# Patient Record
Sex: MALE | Race: WHITE | NOT HISPANIC OR LATINO | Employment: FULL TIME | URBAN - METROPOLITAN AREA
[De-identification: names, ages, dates, MRNs, and addresses within clinical notes are randomized per-mention and may not be internally consistent; named-entity substitution may affect disease eponyms.]

---

## 2018-10-03 ENCOUNTER — OFFICE VISIT (OUTPATIENT)
Dept: NEUROLOGY | Facility: CLINIC | Age: 43
End: 2018-10-03

## 2018-10-03 VITALS
HEIGHT: 71 IN | BODY MASS INDEX: 23.1 KG/M2 | OXYGEN SATURATION: 98 % | HEART RATE: 72 BPM | DIASTOLIC BLOOD PRESSURE: 80 MMHG | SYSTOLIC BLOOD PRESSURE: 125 MMHG | WEIGHT: 165 LBS

## 2018-10-03 DIAGNOSIS — R20.2 NUMBNESS AND TINGLING IN LEFT ARM: Primary | ICD-10-CM

## 2018-10-03 DIAGNOSIS — R20.0 LEFT LEG NUMBNESS: ICD-10-CM

## 2018-10-03 DIAGNOSIS — R90.82 WHITE MATTER ABNORMALITY ON MRI OF BRAIN: ICD-10-CM

## 2018-10-03 DIAGNOSIS — R20.0 NUMBNESS AND TINGLING IN LEFT ARM: Primary | ICD-10-CM

## 2018-10-03 DIAGNOSIS — R29.898 WEAKNESS OF BOTH LEGS: ICD-10-CM

## 2018-10-03 PROCEDURE — 99244 OFF/OP CNSLTJ NEW/EST MOD 40: CPT | Performed by: PSYCHIATRY & NEUROLOGY

## 2018-10-03 NOTE — PROGRESS NOTES
Bayron Hanks is a 42 y.o. male presents for   Chief Complaint   Patient presents with   • Neuralgia             CHIEF COMPLAINT:  Neurological consultation from Dr. Tobin Curran with:  1.  Left lower extremity pain and numbness  2.  Left upper extremity numbness  3.  Single episode of transient weakness of the legs  4.  Abnormal brain MRI    History of Present Illness  A 42 YOM, right-handed who is here was multiple complaints as noted on the chief complaint.  Patient was well, 100%, until 6 months ago.  #1.  Pain into posterior left leg and buttock  Pain is described as deep and dull, comes and goes, not associated with weakness, not associated with low back pain, pain does not go below the level of the left knee, the pain is always a posteriorly located.  Symptoms have not gotten any worse and that has been no related news symptoms  Pain is only present when he is sitting or lying down and goes away when he walking or standing or moving around.  He has no weakness in his left lower extremity, he is able to go up stairs and walk without difficulty.  He has no symptoms in his right lower extremity.   There has been no difficulty with bowel or bladder function or sexual function  Patient does not take any medications for this and this has not alleviated.  This is activity of daily living  Workup including MRI of the lumbar spine as well as MRI of the thoracic spine on September 4, 2018.  I reviewed the MRI reports which shows a very tiny left foraminal narrowing at L4 5 and a small disc bulge at T2-T3 without neurological compromise.    #2.  Left upper extremity numbness.  That started 2 months ago for no reason.  He describes it as numb or tingly, comes and goes, it involves the inferior left shoulder joint posteriorly down to the left little finger, sparing the rest of the left upper extremity.  It comes and goes for no reason.  He has peripheral dysfunctions the left upper extremity including 100% 5  multiple throat.  He has had no weakness with it.  This is totally independent of the problem in his left lower extremity.  He has no neck pain.  It comes and goes for no clear reason whatsoever.  He has had no associated neurological or systemic symptoms otherwise.  Workup included MRI scan of the brain on September 4, 2018 showing a tiny benign-appearing punctate area of increased T2 signal deep in the left frontal lobe.  No other abnormalities were noted      #3.  As we were about to close the visit the patient indicated to me that 1 month.  2 the MRI scans, patient woke up at his usual time 6 AM.  9 AM he went to the bathroom to have a bowel movement, he felt a bit constipated, he was in the toilet for about 20 minutes, when he stood up his legs gave out on him and he lost control of his bowels.  No other associated neurological symptoms.  He was able to grab the door he fell backwards back of the toilet seat without suffering any injury.  Was no pain associated with this.  He thinks his legs may have gotten numb sitting on the toilet.    The rest of the patient's comprehensive neurological review was otherwise nonremarkable  Patient is very worried about the abnormalities on his imaging studies and is looking for answers and explanations.    I have reviewed with the patient the review of systems, past medical history, family history, social history and medications.  The following portions of the patient's history were reviewed and updated as appropriate: allergies, current medications, past family history, past medical history, past social history, past surgical history and problem list.        Review of Systems   Constitutional: Negative.    HENT: Negative.    Eyes: Negative.    Respiratory: Negative.    Cardiovascular: Negative.    Gastrointestinal: Negative.    Endocrine: Negative.    Genitourinary: Negative.    Musculoskeletal: Negative.    Skin: Negative.    Allergic/Immunologic: Negative.    Neurological:  Positive for numbness. Negative for dizziness, tremors, seizures, syncope, facial asymmetry, speech difficulty, weakness, light-headedness and headaches.   Hematological: Negative.    Psychiatric/Behavioral: Negative.          History reviewed. No pertinent past medical history.      Social History     Social History   • Marital status:      Spouse name: N/A   • Number of children: N/A   • Years of education: N/A     Occupational History   • Not on file.     Social History Main Topics   • Smoking status: Never Smoker   • Smokeless tobacco: Not on file   • Alcohol use Yes      Comment: 0-5 DRINKS PER WEEK   • Drug use: No   • Sexual activity: Not on file     Other Topics Concern   • Not on file     Social History Narrative   • No narrative on file          History reviewed. No pertinent family history.      No current outpatient prescriptions on file.      Vitals:    10/03/18 0817   BP: 125/80   Pulse: 72   SpO2: 98%         Physical Exam   Constitutional: He is oriented to person, place, and time. He appears well-developed and well-nourished. No distress.   HENT:   Head: Normocephalic and atraumatic.   Mouth/Throat: Oropharynx is clear and moist. No oropharyngeal exudate.   Eyes: Pupils are equal, round, and reactive to light. Conjunctivae and EOM are normal. Right eye exhibits no discharge. Left eye exhibits no discharge. No scleral icterus.   Neck: Normal range of motion.   No foraminal compression signs   Cardiovascular: Normal rate, regular rhythm, normal heart sounds and intact distal pulses.    No murmur heard.  Normal carotid upstroke and normal auscultation   Pulmonary/Chest: Breath sounds normal. No respiratory distress. He has no wheezes.   Abdominal: Soft. Bowel sounds are normal. He exhibits no distension. There is no tenderness.   Musculoskeletal: He exhibits no edema.   Neurological: He is oriented to person, place, and time. He has normal strength. He has a normal Finger-Nose-Finger Test, a  normal Heel to Miller Test, a normal Romberg Test and a normal Tandem Gait Test. Gait normal.   Reflex Scores:       Tricep reflexes are 1+ on the right side and 1+ on the left side.       Bicep reflexes are 1+ on the right side and 1+ on the left side.       Brachioradialis reflexes are 1+ on the right side and 1+ on the left side.       Patellar reflexes are 1+ on the right side and 1+ on the left side.       Achilles reflexes are 1+ on the right side and 1+ on the left side.  Skin: Skin is warm. He is not diaphoretic.   Psychiatric: His speech is normal.         Neurologic Exam     Mental Status   Oriented to person, place, and time.   Registration: recalls 3 of 3 objects. Recall at 5 minutes: recalls 3 of 3 objects. Follows 3 step commands.   Attention: normal. Concentration: normal.   Speech: speech is normal   Level of consciousness: alert  Able to name object. Able to read. Able to repeat. Able to write. Normal comprehension.     Cranial Nerves     CN II   Visual fields full to confrontation.     CN III, IV, VI   Pupils are equal, round, and reactive to light.  Extraocular motions are normal.   Right pupil: Size: 4 mm. Shape: regular. Reactivity: brisk. Consensual response: intact. Accommodation: intact.   Left pupil: Size: 4 mm. Shape: regular. Reactivity: brisk. Consensual response: intact. Accommodation: intact.   CN III: no CN III palsy  CN VI: no CN VI palsy  Nystagmus: none   Diplopia: none  Ophthalmoparesis: none  Upgaze: normal  Downgaze: normal  Conjugate gaze: present    CN V   Facial sensation intact.     CN VII   Facial expression full, symmetric.     CN VIII   CN VIII normal.     CN IX, X   CN IX normal.     CN XI   CN XI normal.     CN XII   CN XII normal.     Motor Exam   Muscle bulk: normal  Overall muscle tone: normal  Right arm pronator drift: absent  Left arm pronator drift: absent  Right leg tone: normal  Left leg tone: normal    Strength   Strength 5/5 throughout. Straight leg raising is  completely negative.  Patient is able to squat without any difficulty.     Sensory Exam   Light touch normal.   Vibration normal.   Proprioception normal.   Pinprick normal.   Graphesthesia: normal  Stereognosis: normal    Gait, Coordination, and Reflexes     Gait  Gait: normal    Coordination   Romberg: negative  Finger to nose coordination: normal  Heel to shin coordination: normal  Tandem walking coordination: normal    Tremor   Resting tremor: absent  Intention tremor: absent  Action tremor: absent    Reflexes   Right brachioradialis: 1+  Left brachioradialis: 1+  Right biceps: 1+  Left biceps: 1+  Right triceps: 1+  Left triceps: 1+  Right patellar: 1+  Left patellar: 1+  Right achilles: 1+  Left achilles: 1+  Right : 1+  Left : 1+  Right plantar: normal  Left plantar: normal  Right Weir: absent  Left Weir: absent  Right ankle clonus: absent  Left ankle clonus: absent  Right pendular knee jerk: absent  Left pendular knee jerk: absent          No visits with results within 2 Month(s) from this visit.   Latest known visit with results is:   No results found for any previous visit.            REVIEW OF STUDIES:  Review by report of brain MRI, MRI of the lumbar spine, and MRI of the thoracic spine normalities reviewed with the patient.  These are incidental abnormalities not related to his symptoms.    DIAGNOSIS, IMPRESSION AND PLAN:  Patient was multiple complaints as noted in details under the history of present illness.  Patient's neurological exam is completely intact.  MRI of the brain thoracic and the lumbar spine do not really give any etiology for patient's complaints.  Symptoms been present for quite sometime without progression, symptoms are intermittent, they don't interfere with activity of daily living and patient, appropriately, is seeking an explanation.  I have reviewed the anatomy of his symptoms, I have reviewed the findings of the MRI scans with him, I reassured the patient is a  pleasant time there is no need for further workup.  The episode he had with his legs gave out on him, he feels maybe he was sitting on the commode for too long and his legs may have gotten numb he had no associated neurological or cardiac symptoms with that episode.  At the present time I don't see a need for further investigation.  Patient was reassured, his questions were answered, and he left satisfied.  Should that be similar to get an symptoms I'm happy to reevaluate him.